# Patient Record
(demographics unavailable — no encounter records)

---

## 2025-01-31 NOTE — END OF VISIT
[Time Spent: ___ minutes] : I have spent [unfilled] minutes of time on the encounter which excludes teaching and separately reported services. [FreeTextEntry4] :    This note was written by Monica Orellana on 01/29/2025 actively solely Dr. Ramon Nicolas M.D. I, Monica Orellana, am scribing for and in the presence of Dr. Ramon Mota M.D. in the following sections HISTORY OF PRESENT ILLNESS, PAST MEDICAL/FAMILY/SOCIAL HISTORY; REVIEW OF SYSTEMS; VITAL SIGNS; PHYSICAL EXAM; ASSESSMENT/PLAN.       All medical record entries made by this scribe at my, Dr. Ramon Mota M.D. direction and personally dictated by me on 01/29/2025. I personally performed the services described in the documentation, reviewed the documentation recorded by the scribe in my presence, and it accurately and completely records my words and actions.

## 2025-01-31 NOTE — HISTORY OF PRESENT ILLNESS
[FreeTextEntry1] : 01/29/2025, 39 y/o male presents for a Vasectomy Consult   Pt reports he is  with 2 children. He and his wife have agreed they are done having children and requests a vasectomy. Procedure risks and benefits explained to pt. Pt understands and request procedure

## 2025-01-31 NOTE — PHYSICAL EXAM
[Urethral Meatus] : meatus normal [Penis Abnormality] : normal circumcised penis [Urinary Bladder Findings] : the bladder was normal on palpation [Epididymis] : the epididymides were normal [Testes Tenderness] : no tenderness of the testes [Testes Mass (___cm)] : there were no testicular masses [General Appearance - In No Acute Distress] : no acute distress [Chaperone Present] : A chaperone was present in the examining room during all aspects of the physical examination [Scrotum] : the scrotum was normal [FreeTextEntry2] : Monica Orellana

## 2025-01-31 NOTE — LETTER BODY
[Consult Letter:] : I had the pleasure of evaluating your patient, [unfilled]. [Please see my note below.] : Please see my note below. [Consult Closing:] : Thank you very much for allowing me to participate in the care of this patient.  If you have any questions, please do not hesitate to contact me. [Sincerely,] : Sincerely, [FreeTextEntry3] :  Dr. Ramon Mota MD

## 2025-01-31 NOTE — ASSESSMENT
[FreeTextEntry1] : O/E Normal   Will schedule pt for Vasectomy at [facility].

## 2025-02-28 NOTE — LETTER BODY
[Consult Letter:] : I had the pleasure of evaluating your patient, [unfilled]. [Please see my note below.] : Please see my note below. [Consult Closing:] : Thank you very much for allowing me to participate in the care of this patient.  If you have any questions, please do not hesitate to contact me. [Sincerely,] : Sincerely, [Dear  ___] : Dear  [unfilled], [FreeTextEntry3] :  Dr. Ramon Mota MD

## 2025-02-28 NOTE — END OF VISIT
[Time Spent: ___ minutes] : I have spent [unfilled] minutes of time on the encounter which excludes teaching and separately reported services. [FreeTextEntry4] :    This note was written by Monica Orellana on 02/28/2025 actively solely Dr. Ramon Nicolas M.D. I, Monica Orellana, am scribing for and in the presence of Dr. Ramon Mota M.D. in the following sections HISTORY OF PRESENT ILLNESS, PAST MEDICAL/FAMILY/SOCIAL HISTORY; REVIEW OF SYSTEMS; VITAL SIGNS; PHYSICAL EXAM; ASSESSMENT/PLAN.       All medical record entries made by this scribe at my, Dr. Ramon Mota M.D. direction and personally dictated by me on 02/28/2025. I personally performed the services described in the documentation, reviewed the documentation recorded by the scribe in my presence, and it accurately and completely records my words and actions.

## 2025-02-28 NOTE — ASSESSMENT
[FreeTextEntry1] : O/E Normal   Pt is informed that Vasectomy procedure is irreversible. Procedure risks and benefits explained to pt. The technique (NSV/open), limitations, success, complications including hematoma, infection, sperm granuloma, and post vasectomy syndrome etc, discussed in detail.  Patient is given the option of local or general anesthesia. He understands and wishes to proceed with vasectomy under general anesthesia.  Pt informed a semen analysis is necessary after vasectomy to confirm there are no sperms in semen. Patient also advised to use contraceptive during this 2 months period until semen analysis reviewed and cleared. Patient will receive further information regarding post vasectomy care after procedure.  Vasectomy Consent Form signed today   Will schedule pt for Vasectomy at [facility].

## 2025-02-28 NOTE — HISTORY OF PRESENT ILLNESS
[FreeTextEntry1] : 02/28/2025, 39 y/o male presents for a Vasectomy Consult   Mr. Mobley reports he is  with 2 children. He and his wife have agreed they are done having children and requests a vasectomy. Pt denies any history of urological intervention.

## 2025-02-28 NOTE — PHYSICAL EXAM
[General Appearance - In No Acute Distress] : no acute distress [Urethral Meatus] : meatus normal [Penis Abnormality] : normal circumcised penis [Urinary Bladder Findings] : the bladder was normal on palpation [Scrotum] : the scrotum was normal [Epididymis] : the epididymides were normal [Testes Tenderness] : no tenderness of the testes [Testes Mass (___cm)] : there were no testicular masses [Chaperone Present] : A chaperone was present in the examining room during all aspects of the physical examination [FreeTextEntry2] : Monica Orellana

## 2025-04-15 NOTE — HISTORY OF PRESENT ILLNESS
[FreeTextEntry1] : [facility] of vasectomy came for check up  Local examination - ( Shelby was chaperone ) Wound healthy  Could palpate Vas Deferens  end bilaterally  Surgical Pathology Report - Auth (Verified) Specimen(s) Submitted 1-Right Vas deferens 2-Left Vas deferens Final Diagnosis 1. Vas deferens, right, excision - Complete cross section of vas deferens  2. Vas deferens, left, excision - Complete cross section of vas deferens  _________________________________________________________________   Gross Description 1.  Received: In formalin labeled  "right vas deferens" Number:  One, tan, soft cylinder Size:  1.5 cm length,  0.1 cm to 0.6 cm diameter Lumen:  Pinpoint Additional Comments:   The entire outer surface is inked black.  The specimen is submitted intact and entirely to be cut by histology Submitted:  Entirely in one cassette: 1A  2.  Received: In formalin labeled  "left vas deferens" Number:  One, tan, soft cylinder Size:  1.0 cm length,  0.3 cm diameter     Plan- Post Vasectomy semen analysis after 4 - 6 weeks  Strict Contraception until Post vasectomy semen analysis is showing no sperms  RTO after 2months

## 2025-04-15 NOTE — PHYSICAL EXAM
[Chaperone Present] : A chaperone was present in the examining room during all aspects of the physical examination [FreeTextEntry2] : Shelby